# Patient Record
Sex: MALE | Race: BLACK OR AFRICAN AMERICAN | NOT HISPANIC OR LATINO | Employment: FULL TIME | URBAN - METROPOLITAN AREA
[De-identification: names, ages, dates, MRNs, and addresses within clinical notes are randomized per-mention and may not be internally consistent; named-entity substitution may affect disease eponyms.]

---

## 2020-02-03 ENCOUNTER — OFFICE VISIT (OUTPATIENT)
Dept: INTERNAL MEDICINE | Facility: CLINIC | Age: 35
End: 2020-02-03
Payer: COMMERCIAL

## 2020-02-03 VITALS
DIASTOLIC BLOOD PRESSURE: 72 MMHG | OXYGEN SATURATION: 99 % | SYSTOLIC BLOOD PRESSURE: 114 MMHG | BODY MASS INDEX: 23.64 KG/M2 | HEIGHT: 71 IN | WEIGHT: 168.88 LBS | HEART RATE: 68 BPM

## 2020-02-03 DIAGNOSIS — M54.50 ACUTE RIGHT-SIDED LOW BACK PAIN WITHOUT SCIATICA: Primary | ICD-10-CM

## 2020-02-03 DIAGNOSIS — Z76.89 ENCOUNTER TO ESTABLISH CARE WITH NEW DOCTOR: ICD-10-CM

## 2020-02-03 PROCEDURE — 99999 PR PBB SHADOW E&M-EST. PATIENT-LVL III: ICD-10-PCS | Mod: PBBFAC,,, | Performed by: FAMILY MEDICINE

## 2020-02-03 PROCEDURE — 99204 PR OFFICE/OUTPT VISIT, NEW, LEVL IV, 45-59 MIN: ICD-10-PCS | Mod: S$GLB,,, | Performed by: FAMILY MEDICINE

## 2020-02-03 PROCEDURE — 99999 PR PBB SHADOW E&M-EST. PATIENT-LVL III: CPT | Mod: PBBFAC,,, | Performed by: FAMILY MEDICINE

## 2020-02-03 PROCEDURE — 3008F PR BODY MASS INDEX (BMI) DOCUMENTED: ICD-10-PCS | Mod: CPTII,S$GLB,, | Performed by: FAMILY MEDICINE

## 2020-02-03 PROCEDURE — 99204 OFFICE O/P NEW MOD 45 MIN: CPT | Mod: S$GLB,,, | Performed by: FAMILY MEDICINE

## 2020-02-03 PROCEDURE — 3008F BODY MASS INDEX DOCD: CPT | Mod: CPTII,S$GLB,, | Performed by: FAMILY MEDICINE

## 2020-02-03 RX ORDER — CYCLOBENZAPRINE HCL 10 MG
10 TABLET ORAL 3 TIMES DAILY PRN
Qty: 30 TABLET | Refills: 0 | Status: SHIPPED | OUTPATIENT
Start: 2020-02-03 | End: 2020-02-13

## 2020-02-03 RX ORDER — IBUPROFEN 800 MG/1
800 TABLET ORAL
Qty: 60 TABLET | Refills: 1 | Status: SHIPPED | OUTPATIENT
Start: 2020-02-03 | End: 2020-02-13

## 2020-02-03 NOTE — PROGRESS NOTES
"Subjective:       Patient ID: Félix Daniels is a 34 y.o. male.    Chief Complaint: Establish Care and Back Pain (x2 weeks)    Back Pain   Pertinent negatives include no abdominal pain, chest pain, dysuria, fever, headaches, numbness or weakness.       34yoM to [facility]. Last IM visit >4yrs ago.    Acute back pain x 2wks. Woke up randomly one morning with pain in low back. Restarted exercise regimen at beginning of year. Sharp, achy pains without radiation. Feels it at night when laying down and sometimes wakes up from sleep. Stretching helps. No meds taken. Denies urinary or bowel changes, numbness, tingling, shooting pain, n/v, fever, chills.    Review of Systems   Constitutional: Negative for fatigue and fever.   HENT: Negative for ear pain, sinus pain and sore throat.    Respiratory: Negative for cough and shortness of breath.    Cardiovascular: Negative for chest pain and palpitations.   Gastrointestinal: Negative for abdominal pain, constipation, diarrhea, nausea and vomiting.   Genitourinary: Negative for dysuria and hematuria.   Musculoskeletal: Positive for back pain.   Skin: Negative for rash.   Neurological: Negative for weakness, numbness and headaches.         Past Medical History:   Diagnosis Date    HSV-1 infection     Normocytic anemia         Prior to Admission medications    Medication Sig Start Date End Date Taking? Authorizing Provider   urea (CARMOL) 40 % Crea Apply topically 2 (two) times daily. To affected area of feet  Patient not taking: Reported on 2/3/2020 7/31/15   aKterina Lacey DPM        Past medical history, surgical history, and family medical history reviewed and updated as appropriate.    Medications and allergies reviewed.     Objective:          Vitals:    02/03/20 0857   BP: 114/72   BP Location: Right arm   Patient Position: Sitting   BP Method: Medium (Manual)   Pulse: 68   SpO2: 99%   Weight: 76.6 kg (168 lb 14 oz)   Height: 5' 11" (1.803 m)     Body mass index is 23.55 " kg/m².  Physical Exam   Constitutional: He is oriented to person, place, and time. He appears well-developed and well-nourished.   Eyes: Pupils are equal, round, and reactive to light. EOM are normal.   Cardiovascular: Normal rate, regular rhythm and normal heart sounds.   No murmur heard.  Pulmonary/Chest: Effort normal and breath sounds normal. No respiratory distress. He has no wheezes.   Abdominal: Soft. Bowel sounds are normal. He exhibits no distension. There is no tenderness.   Musculoskeletal: Normal range of motion. He exhibits no edema, tenderness or deformity.        Lumbar back: He exhibits pain and spasm. He exhibits normal range of motion, no tenderness, no swelling and no edema.   Negative straight leg testing.   Neurological: He is alert and oriented to person, place, and time.       Lab Results   Component Value Date    WBC 6.00 07/27/2015    HGB 13.7 (L) 07/27/2015    HCT 41.0 07/27/2015     07/27/2015    CHOL 220 (H) 07/27/2015    TRIG 46 07/27/2015    HDL 88 (H) 07/27/2015    ALT 19 07/27/2015    AST 22 07/27/2015     07/27/2015    K 4.4 07/27/2015     07/27/2015    CREATININE 1.0 07/27/2015    BUN 12 07/27/2015    CO2 30 (H) 07/27/2015    TSH 1.270 07/27/2015    HGBA1C 5.7 07/27/2015       Assessment:       1. Acute right-sided low back pain without sciatica    2. Encounter to establish care with new doctor        Plan:   Félix was seen today for establish care and back pain.    Diagnoses and all orders for this visit:    Possible muscle strain, will start NSAIDs and muscle spasm prn. F/u in 2-4 wks to reeval, consider PT. Continue home stretches and exercises, avoid bed rest. Monitor for red flag symptoms as discussed and report to ED as necessary.    Acute right-sided low back pain without sciatica  -     ibuprofen (ADVIL,MOTRIN) 800 MG tablet; Take 1 tablet (800 mg total) by mouth every meal as needed for Pain.  -     cyclobenzaprine (FLEXERIL) 10 MG tablet; Take 1 tablet  (10 mg total) by mouth 3 (three) times daily as needed for Muscle spasms.    Encounter to establish care with new doctor        Health maintenance reviewed with patient.     Follow up in about 4 weeks (around 3/2/2020).    Felipe Sheffield MD  Family Medicine  [facility]  2/3/2020

## 2021-04-05 ENCOUNTER — PATIENT MESSAGE (OUTPATIENT)
Dept: ADMINISTRATIVE | Facility: HOSPITAL | Age: 36
End: 2021-04-05

## 2021-04-16 ENCOUNTER — PATIENT MESSAGE (OUTPATIENT)
Dept: RESEARCH | Facility: HOSPITAL | Age: 36
End: 2021-04-16

## 2021-07-07 ENCOUNTER — PATIENT MESSAGE (OUTPATIENT)
Dept: ADMINISTRATIVE | Facility: HOSPITAL | Age: 36
End: 2021-07-07

## 2021-10-04 ENCOUNTER — PATIENT MESSAGE (OUTPATIENT)
Dept: ADMINISTRATIVE | Facility: HOSPITAL | Age: 36
End: 2021-10-04

## 2022-01-26 ENCOUNTER — PATIENT MESSAGE (OUTPATIENT)
Dept: ADMINISTRATIVE | Facility: HOSPITAL | Age: 37
End: 2022-01-26
Payer: COMMERCIAL

## 2022-03-16 ENCOUNTER — PATIENT MESSAGE (OUTPATIENT)
Dept: ADMINISTRATIVE | Facility: HOSPITAL | Age: 37
End: 2022-03-16
Payer: COMMERCIAL

## 2022-08-10 ENCOUNTER — CLINICAL SUPPORT (OUTPATIENT)
Dept: OTHER | Facility: CLINIC | Age: 37
End: 2022-08-10

## 2022-08-10 DIAGNOSIS — Z00.8 ENCOUNTER FOR OTHER GENERAL EXAMINATION: ICD-10-CM

## 2022-08-11 VITALS
SYSTOLIC BLOOD PRESSURE: 118 MMHG | WEIGHT: 192.63 LBS | BODY MASS INDEX: 26.97 KG/M2 | HEIGHT: 71 IN | DIASTOLIC BLOOD PRESSURE: 69 MMHG

## 2022-08-11 LAB
GLUCOSE SERPL-MCNC: 103 MG/DL (ref 60–140)
HDLC SERPL-MCNC: 97 MG/DL
POC CHOLESTEROL, LDL (DOCK): 112.98 MG/DL
POC CHOLESTEROL, TOTAL: 227 MG/DL
TRIGL SERPL-MCNC: 100 MG/DL

## 2023-10-09 NOTE — PROGRESS NOTES
Biometrics completed.    Results reviewed with a Registered Nurse; understanding of results and   educational materials was verbalized.   Tong was due to have his INR obtained tomorrow 10/10/23 as he has a Cardiology appt however he went today. [facility] to review.